# Patient Record
Sex: MALE | Race: WHITE | NOT HISPANIC OR LATINO | Employment: FULL TIME | URBAN - METROPOLITAN AREA
[De-identification: names, ages, dates, MRNs, and addresses within clinical notes are randomized per-mention and may not be internally consistent; named-entity substitution may affect disease eponyms.]

---

## 2021-11-08 ENCOUNTER — APPOINTMENT (OUTPATIENT)
Dept: CT IMAGING | Facility: CLINIC | Age: 41
End: 2021-11-08
Attending: EMERGENCY MEDICINE
Payer: COMMERCIAL

## 2021-11-08 ENCOUNTER — HOSPITAL ENCOUNTER (INPATIENT)
Facility: CLINIC | Age: 41
LOS: 5 days | Discharge: HOME | End: 2021-11-13
Attending: EMERGENCY MEDICINE | Admitting: INTERNAL MEDICINE
Payer: COMMERCIAL

## 2021-11-08 DIAGNOSIS — U07.1 INFECTION DUE TO 2019 NOVEL CORONAVIRUS: ICD-10-CM

## 2021-11-08 LAB
ALBUMIN SERPL-MCNC: 3.4 G/DL (ref 3.4–5)
ALP SERPL-CCNC: 44 U/L (ref 40–150)
ALT SERPL W P-5'-P-CCNC: 71 U/L (ref 0–70)
ANION GAP SERPL CALCULATED.3IONS-SCNC: 7 MMOL/L (ref 3–14)
AST SERPL W P-5'-P-CCNC: 65 U/L (ref 0–45)
BASOPHILS # BLD AUTO: 0 10E3/UL (ref 0–0.2)
BASOPHILS NFR BLD AUTO: 0 %
BILIRUB SERPL-MCNC: 0.4 MG/DL (ref 0.2–1.3)
BUN SERPL-MCNC: 10 MG/DL (ref 7–30)
CALCIUM SERPL-MCNC: 8.4 MG/DL (ref 8.5–10.1)
CHLORIDE BLD-SCNC: 95 MMOL/L (ref 94–109)
CO2 SERPL-SCNC: 30 MMOL/L (ref 20–32)
CREAT SERPL-MCNC: 1.04 MG/DL (ref 0.66–1.25)
D DIMER PPP FEU-MCNC: 0.61 UG/ML FEU (ref 0–0.5)
EOSINOPHIL # BLD AUTO: 0 10E3/UL (ref 0–0.7)
EOSINOPHIL NFR BLD AUTO: 0 %
ERYTHROCYTE [DISTWIDTH] IN BLOOD BY AUTOMATED COUNT: 12.4 % (ref 10–15)
GFR SERPL CREATININE-BSD FRML MDRD: 88 ML/MIN/1.73M2
GLUCOSE BLD-MCNC: 143 MG/DL (ref 70–99)
HCT VFR BLD AUTO: 43.9 % (ref 40–53)
HGB BLD-MCNC: 15.1 G/DL (ref 13.3–17.7)
HOLD SPECIMEN: NORMAL
IMM GRANULOCYTES # BLD: 0.1 10E3/UL
IMM GRANULOCYTES NFR BLD: 1 %
LYMPHOCYTES # BLD AUTO: 1.2 10E3/UL (ref 0.8–5.3)
LYMPHOCYTES NFR BLD AUTO: 13 %
MCH RBC QN AUTO: 31 PG (ref 26.5–33)
MCHC RBC AUTO-ENTMCNC: 34.4 G/DL (ref 31.5–36.5)
MCV RBC AUTO: 90 FL (ref 78–100)
MONOCYTES # BLD AUTO: 0.3 10E3/UL (ref 0–1.3)
MONOCYTES NFR BLD AUTO: 4 %
NEUTROPHILS # BLD AUTO: 7.2 10E3/UL (ref 1.6–8.3)
NEUTROPHILS NFR BLD AUTO: 82 %
NRBC # BLD AUTO: 0 10E3/UL
NRBC BLD AUTO-RTO: 0 /100
PLATELET # BLD AUTO: 199 10E3/UL (ref 150–450)
POTASSIUM BLD-SCNC: 4.3 MMOL/L (ref 3.4–5.3)
PROT SERPL-MCNC: 8.5 G/DL (ref 6.8–8.8)
RBC # BLD AUTO: 4.87 10E6/UL (ref 4.4–5.9)
SODIUM SERPL-SCNC: 132 MMOL/L (ref 133–144)
TROPONIN I SERPL-MCNC: <0.015 UG/L (ref 0–0.04)
WBC # BLD AUTO: 8.7 10E3/UL (ref 4–11)

## 2021-11-08 PROCEDURE — 82040 ASSAY OF SERUM ALBUMIN: CPT | Performed by: EMERGENCY MEDICINE

## 2021-11-08 PROCEDURE — 85379 FIBRIN DEGRADATION QUANT: CPT | Performed by: EMERGENCY MEDICINE

## 2021-11-08 PROCEDURE — 120N000001 HC R&B MED SURG/OB

## 2021-11-08 PROCEDURE — 258N000003 HC RX IP 258 OP 636: Performed by: INTERNAL MEDICINE

## 2021-11-08 PROCEDURE — 250N000011 HC RX IP 250 OP 636: Performed by: EMERGENCY MEDICINE

## 2021-11-08 PROCEDURE — 96375 TX/PRO/DX INJ NEW DRUG ADDON: CPT

## 2021-11-08 PROCEDURE — 250N000013 HC RX MED GY IP 250 OP 250 PS 637: Performed by: INTERNAL MEDICINE

## 2021-11-08 PROCEDURE — 250N000011 HC RX IP 250 OP 636: Performed by: INTERNAL MEDICINE

## 2021-11-08 PROCEDURE — 36415 COLL VENOUS BLD VENIPUNCTURE: CPT | Performed by: EMERGENCY MEDICINE

## 2021-11-08 PROCEDURE — 250N000009 HC RX 250: Performed by: INTERNAL MEDICINE

## 2021-11-08 PROCEDURE — 250N000009 HC RX 250: Performed by: EMERGENCY MEDICINE

## 2021-11-08 PROCEDURE — 250N000013 HC RX MED GY IP 250 OP 250 PS 637: Performed by: EMERGENCY MEDICINE

## 2021-11-08 PROCEDURE — 96361 HYDRATE IV INFUSION ADD-ON: CPT

## 2021-11-08 PROCEDURE — 84484 ASSAY OF TROPONIN QUANT: CPT | Performed by: EMERGENCY MEDICINE

## 2021-11-08 PROCEDURE — 258N000003 HC RX IP 258 OP 636: Performed by: EMERGENCY MEDICINE

## 2021-11-08 PROCEDURE — 96365 THER/PROPH/DIAG IV INF INIT: CPT | Mod: 59

## 2021-11-08 PROCEDURE — 85025 COMPLETE CBC W/AUTO DIFF WBC: CPT | Performed by: EMERGENCY MEDICINE

## 2021-11-08 PROCEDURE — 71275 CT ANGIOGRAPHY CHEST: CPT

## 2021-11-08 PROCEDURE — 93005 ELECTROCARDIOGRAM TRACING: CPT

## 2021-11-08 PROCEDURE — 99223 1ST HOSP IP/OBS HIGH 75: CPT | Mod: AI | Performed by: INTERNAL MEDICINE

## 2021-11-08 PROCEDURE — 94640 AIRWAY INHALATION TREATMENT: CPT

## 2021-11-08 PROCEDURE — 99285 EMERGENCY DEPT VISIT HI MDM: CPT | Mod: 25

## 2021-11-08 PROCEDURE — 80053 COMPREHEN METABOLIC PANEL: CPT | Performed by: EMERGENCY MEDICINE

## 2021-11-08 RX ORDER — ACETAMINOPHEN 325 MG/1
975 TABLET ORAL EVERY 8 HOURS
Status: DISCONTINUED | OUTPATIENT
Start: 2021-11-08 | End: 2021-11-13 | Stop reason: HOSPADM

## 2021-11-08 RX ORDER — ONDANSETRON 4 MG/1
4 TABLET, ORALLY DISINTEGRATING ORAL EVERY 6 HOURS PRN
Status: DISCONTINUED | OUTPATIENT
Start: 2021-11-08 | End: 2021-11-13 | Stop reason: HOSPADM

## 2021-11-08 RX ORDER — ACETAMINOPHEN 325 MG/1
TABLET ORAL
Status: DISCONTINUED
Start: 2021-11-08 | End: 2021-11-09 | Stop reason: HOSPADM

## 2021-11-08 RX ORDER — ACETAMINOPHEN 500 MG
1000 TABLET ORAL ONCE
Status: COMPLETED | OUTPATIENT
Start: 2021-11-08 | End: 2021-11-08

## 2021-11-08 RX ORDER — IOPAMIDOL 755 MG/ML
500 INJECTION, SOLUTION INTRAVASCULAR ONCE
Status: COMPLETED | OUTPATIENT
Start: 2021-11-08 | End: 2021-11-08

## 2021-11-08 RX ORDER — CODEINE PHOSPHATE AND GUAIFENESIN 10; 100 MG/5ML; MG/5ML
5-10 SOLUTION ORAL EVERY 4 HOURS PRN
COMMUNITY
Start: 2021-11-05 | End: 2021-11-19

## 2021-11-08 RX ORDER — ONDANSETRON 2 MG/ML
4 INJECTION INTRAMUSCULAR; INTRAVENOUS EVERY 6 HOURS PRN
Status: DISCONTINUED | OUTPATIENT
Start: 2021-11-08 | End: 2021-11-13 | Stop reason: HOSPADM

## 2021-11-08 RX ORDER — BENZONATATE 100 MG/1
100-200 CAPSULE ORAL 3 TIMES DAILY PRN
COMMUNITY
Start: 2021-11-06

## 2021-11-08 RX ORDER — DEXAMETHASONE SODIUM PHOSPHATE 10 MG/ML
6 INJECTION, SOLUTION INTRAMUSCULAR; INTRAVENOUS ONCE
Status: COMPLETED | OUTPATIENT
Start: 2021-11-08 | End: 2021-11-08

## 2021-11-08 RX ORDER — LIDOCAINE 40 MG/G
CREAM TOPICAL
Status: DISCONTINUED | OUTPATIENT
Start: 2021-11-08 | End: 2021-11-13 | Stop reason: HOSPADM

## 2021-11-08 RX ORDER — ALBUTEROL SULFATE 90 UG/1
2 AEROSOL, METERED RESPIRATORY (INHALATION) 4 TIMES DAILY
Status: DISCONTINUED | OUTPATIENT
Start: 2021-11-08 | End: 2021-11-09

## 2021-11-08 RX ORDER — FAMOTIDINE 20 MG/1
20 TABLET, FILM COATED ORAL 2 TIMES DAILY
Status: DISCONTINUED | OUTPATIENT
Start: 2021-11-08 | End: 2021-11-13 | Stop reason: HOSPADM

## 2021-11-08 RX ADMIN — ACETAMINOPHEN 975 MG: 325 TABLET, FILM COATED ORAL at 21:11

## 2021-11-08 RX ADMIN — ALBUTEROL SULFATE 2 PUFF: 90 AEROSOL, METERED RESPIRATORY (INHALATION) at 21:13

## 2021-11-08 RX ADMIN — REMDESIVIR 200 MG: 100 INJECTION, POWDER, LYOPHILIZED, FOR SOLUTION INTRAVENOUS at 21:13

## 2021-11-08 RX ADMIN — FAMOTIDINE 20 MG: 20 TABLET ORAL at 21:13

## 2021-11-08 RX ADMIN — ACETAMINOPHEN 1000 MG: 500 TABLET, FILM COATED ORAL at 12:24

## 2021-11-08 RX ADMIN — SODIUM CHLORIDE 90 ML: 9 INJECTION, SOLUTION INTRAVENOUS at 13:32

## 2021-11-08 RX ADMIN — SODIUM CHLORIDE 1000 ML: 9 INJECTION, SOLUTION INTRAVENOUS at 12:24

## 2021-11-08 RX ADMIN — ENOXAPARIN SODIUM 40 MG: 40 INJECTION SUBCUTANEOUS at 21:13

## 2021-11-08 RX ADMIN — DEXAMETHASONE SODIUM PHOSPHATE 6 MG: 10 INJECTION INTRAMUSCULAR; INTRAVENOUS at 14:30

## 2021-11-08 RX ADMIN — IOPAMIDOL 77 ML: 755 INJECTION, SOLUTION INTRAVENOUS at 13:32

## 2021-11-08 RX ADMIN — SODIUM CHLORIDE 50 ML: 900 INJECTION INTRAVENOUS at 21:43

## 2021-11-08 ASSESSMENT — ENCOUNTER SYMPTOMS
FEVER: 1
CHILLS: 1
VOMITING: 0
SHORTNESS OF BREATH: 1
COUGH: 1

## 2021-11-08 ASSESSMENT — ACTIVITIES OF DAILY LIVING (ADL)
ADLS_ACUITY_SCORE: 7

## 2021-11-08 NOTE — ED TRIAGE NOTES
Patient arrives via EMS, patient was diagnosed with COVID on Friday and also states he was told he has pneumonia.  Patient states he hasn't gotten any better since Friday.  Patient arrives on 6 liters via nasal cannula, EMS states he was 88% upon there arrival.  Patient also has a 102.2 fever.

## 2021-11-08 NOTE — H&P
[facility]    Hospitalist History and Physical    Name: Jay Becker    MRN: [id number]  YOB: 1980    Age: 41 year old  Date of Admission:  11/8/2021  Date of Service (when I saw the patient): 11/08/21    Assessment & Plan   Jay Becker is a 41 year old male unvaccinated with with history of obesity, presented to the emergency room with ongoing cough fever shortness of breath with positive Covid test on 11/5/2021 was found to be hypoxic in the emergency room admitted for acute respiratory failure with hypoxia      Acute respiratory failure with hypoxia secondary to COVID-19 pneumonia  -Unvaccinated patient  -Symptoms started 7 days ago  -COVID-19 + 11/5/2021  -Presented with worsening dyspnea  -Requiring 2 L of supplemental O2 to keep oxygenation above 90%  -We will start on Decadron  -Discussed remdesivir will order  -Enoxaparin prophylaxis  -GI prophylaxis  -Supplemental O2 wean as able  -Admit to medicine    DVT Prophylaxis: Enoxaparin (Lovenox) SQ  Code Status: Full Code    Disposition: Admitted as inpatient for COVID-19 pneumonia at least 2 to 3 days    Primary Care Physician   Physician No Ref-Primary    Chief Complaint   Ongoing symptoms worsening lethargy weakness shortness of breath cough and fever    History is obtained from the patient    History of Present Illness   Jay Becker is a 41 year old male with past medical his significant for obesity presented to the emergency room with worsening cough fever generalized malaise and weakness.  Patient is unvaccinated symptoms initially started about 7 days ago when he was in [place].  He initially felt sick while he was there and had a sick nephew with strep throat.  Symptoms started with cough shortness of breath fevers generalized malaise and weakness.  With ongoing symptoms he got tested for COVID-19 on 11 5 which was positive.  Today he had ongoing symptoms with significant night sweats malaise weakness fatigue unable to  do anything with lack of energy and trouble breathing came to the emergency room.  In the emergency room was found to be hypoxic is being admitted for acute respiratory failure.  More than 10 point review of systems was carried out was otherwise negative    Past Medical History    No past medical history on file.      Past Surgical History   No past surgical history on file.    Prior to Admission Medications   None     Allergies   No Known Allergies    Social History   Social History     Tobacco Use     Smoking status: Not on file     Smokeless tobacco: Not on file   Substance Use Topics     Alcohol use: Not on file     Social History     Social History Narrative     Not on file     Lives Alone,  non-smoker, denies use of alcohol    Family History   I have reviewed this patient's family history and updated it with pertinent information if needed.   No family history on file.  History significant for diabetes mellitus has diabetes mellitus    Review of Systems   A Comprehensive greater than 10 system review of systems was carried out.  Pertinent positives and negatives are noted above.  Otherwise negative for contributory information.    Physical Exam   Temp: (!) 102.2  F (39  C) Temp src: Oral BP: (!) 148/102 Pulse: 96   Resp: 22 SpO2: (!) 89 % O2 Device: None (Room air) Oxygen Delivery: 6 LPM  Vital Signs with Ranges  Temp:  [102.2  F (39  C)] 102.2  F (39  C)  Pulse:  [] 96  Resp:  [22] 22  BP: (145-158)/() 148/102  SpO2:  [89 %-97 %] 89 %  280 lbs 0 oz    GEN:  Alert, oriented x 3, appears comfortable, no overt distress.  Requiring supplemental O2  HEENT:  Normocephalic/atraumatic, no scleral icterus, no nasal discharge, mouth moist.  CV:  Regular rate and rhythm, no murmur or JVD.  S1 + S2 noted, no S3 or S4.  LUNGS: Bibasilar crackles symmetric chest rise on inhalation noted.  ABD:  Active bowel sounds, soft, non-tender/non-distended.  No rebound/guarding/rigidity.  EXT:  No edema.  No cyanosis.  No  joint synovitis noted.  SKIN:  Dry to touch, no exanthems noted in the visualized areas.  NEURO:  Symmetric muscle strength,.  No new focal deficits appreciated.    Data   Data reviewed today:  I personally reviewed the EKG tracing showing Normal sinus rhythm.    No results for input(s): PH, PHV, PO2, PO2V, SAT, PCO2, PCO2V, HCO3, HCO3V in the last 168 hours.  Recent Labs   Lab 11/08/21  1203   WBC 8.7   HGB 15.1   HCT 43.9   MCV 90        Recent Labs   Lab 11/08/21  1203   *   POTASSIUM 4.3   CHLORIDE 95   CO2 30   ANIONGAP 7   *   BUN 10   CR 1.04   GFRESTIMATED 88   ARNALDO 8.4*     No results for input(s): CULT in the last 168 hours.  No results for input(s): NTBNPI, NTBNP in the last 168 hours.  Recent Labs   Lab 11/08/21  1203   DD 0.61*     Recent Labs   Lab 11/08/21  1203   HGB 15.1     Recent Labs   Lab 11/08/21  1203   AST 65*   ALT 71*   ALKPHOS 44   BILITOTAL 0.4     No results for input(s): INR in the last 168 hours.  No results for input(s): LACT in the last 168 hours.  No results for input(s): LIPASE in the last 168 hours.  No results for input(s): TSH in the last 168 hours.  Recent Labs   Lab 11/08/21  1203   TROPONIN <0.015     No results for input(s): COLOR, APPEARANCE, URINEGLC, URINEBILI, URINEKETONE, SG, UBLD, URINEPH, PROTEIN, UROBILINOGEN, NITRITE, LEUKEST, RBCU, WBCU in the last 168 hours.    Recent Results (from the past 24 hour(s))   CT Chest Pulmonary Embolism w Contrast    Narrative    CT CHEST PULMONARY EMBOLISM WITH CONTRAST 11/8/2021 1:39 PM    CLINICAL HISTORY: PE suspected, low/intermediate probability, positive  D-dimer. Shortness of breath. Fever.    TECHNIQUE: CT angiogram chest during arterial phase injection IV  contrast. 2D and 3D MIP reconstructions were performed by the CT  technologist. Dose reduction techniques were used.     CONTRAST: 77mL Isovue-370    COMPARISON: None.    FINDINGS:  ANGIOGRAM CHEST: Pulmonary arteries are normal caliber and negative  for  pulmonary emboli. Thoracic aorta is negative for dissection.    LUNGS AND PLEURA: Multifocal bilateral irregular patchy consolidation  involving all lobes of both lungs. This has a bibasilar predominance.  No effusion.    MEDIASTINUM/AXILLAE: Normal.    CORONARY ARTERY CALCIFICATION: None.    UPPER ABDOMEN: Hepatic steatosis.    MUSCULOSKELETAL: Normal.      Impression    IMPRESSION:  1.  Bilateral irregular pulmonary consolidation consistent with  atypical pneumonia such as COVID 19 pneumonia.  2.  No evidence for pulmonary embolism.  3.  Fatty liver.    CHRISTOPH ESTRADA MD         SYSTEM ID:  [id number]

## 2021-11-08 NOTE — ED PROVIDER NOTES
History   Chief Complaint:  Covid Concern       HPI     Jay Becker is a 41 year old male who presents with a COVID concern via EMS.  Patient reports he became ill approximately 7 days prior.  Initial symptoms were nasal congestion, rhinorrhea and mild cough.  He tested positive for COVID-19 as an outpatient.  Patient is unvaccinated.  Symptoms progressed to generalized weakness, fatigue and fever.  He presents today for increasing shortness of breath.  Dyspnea is worsened by exertion, alleviated by rest.  There is no associated chest pain, hemoptysis.  No history of DVT, PE, unilateral leg swelling, malignancy.  He recently drove from [place] within the last 4 weeks.  He denies vomiting, rash or any other concerns.    Review of Systems   Constitutional: Positive for chills and fever.   Respiratory: Positive for cough and shortness of breath.    Cardiovascular: Negative for chest pain.   Gastrointestinal: Negative for vomiting.   Skin: Negative for rash.   All other systems reviewed and are negative.    Allergies:  No Known Drug Allergies      Medications:  There are no current outpatient medications.    Past Medical History:    No past medical history.     Social History:  The patient was accompanied to the ED by EMS.      Physical Exam     Patient Vitals for the past 24 hrs:   BP Temp Temp src Pulse Resp SpO2 Weight   11/08/21 1526 -- -- -- -- -- 94 % --   11/08/21 1524 -- -- -- -- -- 94 % --   11/08/21 1523 -- -- -- -- -- 94 % --   11/08/21 1522 -- -- -- -- -- 91 % --   11/08/21 1515 (!) 144/94 -- -- 85 -- 93 % --   11/08/21 1500 (!) 144/87 -- -- 87 -- 90 % --   11/08/21 1445 (!) 140/90 -- -- 81 -- 92 % --   11/08/21 1430 137/81 -- -- 87 -- 92 % --   11/08/21 1402 -- -- -- -- -- (!) 89 % --   11/08/21 1401 -- -- -- -- -- 90 % --   11/08/21 1400 -- -- -- -- -- 97 % --   11/08/21 1330 -- -- -- -- -- 91 % --   11/08/21 1315 (!) 148/102 -- -- 96 -- 92 % --   11/08/21 1300 (!) 145/94 -- -- 98 -- 91 % --   11/08/21 1156  (!) 158/107 (!) 102.2  F (39  C) Oral 101 22 97 % 127 kg (280 lb)       Physical Exam      Eyes:    Conjunctiva normal  Neck:    Supple, no meningismus.     CV:     Regular rate and rhythm.      No murmurs, rubs or gallops.       No unilateral leg swelling.       2+ radial pulses bilateral.       No lower extremity edema.  PULM:    Clear to auscultation bilateral.       No respiratory distress.      Good air exchange.     No rales or wheezing.     Intermittent non-productive cough  ABD:    Soft, non-tender, non-distended.       No pulsatile masses.       No rebound, guarding or rigidity.  MSK:     No gross deformity to all four extremities.   LYMPH:   No cervical lymphadenopathy.  NEURO:   Alert. Good muscle tone, no atrophy.  Skin:    Warm, dry and intact.    Psych:    Mood is depressed and affect is appropriate.        Emergency Department Course   ECG  ECG obtained at 1249, ECG read at 1255  Normal sinus rhythm   Cannot rule out Inferior infarct, age undetermined   Abnormal ECG  Rate 99 bpm. IN interval 152 ms. QRS duration 84 ms. QT/QTc 326/418 ms. P-R-T axes 48 9 26.     Imaging:  CT Chest Pulmonary Embolism w Contrast  1.  Bilateral irregular pulmonary consolidation consistent with  atypical pneumonia such as COVID 19 pneumonia.  2.  No evidence for pulmonary embolism.  3.  Fatty liver.    CHRISTOPH ESTRADA MD   Report per radiology    Laboratory:  CBC: WBC 8.7, HGB 15.1,   CMP: sodium 132 (L), calcium 8.4 (L), glucose 143 (H), AST 65 (H), ALT 71 (H) o/w WNL (Creatinine 1.04)   Troponin (Collected 1203): <0.015  D Dimer (Collected 1203): 0.61 (H)    Emergency Department Course:  Reviewed:  I reviewed nursing notes, vitals, past medical history and Care Everywhere    Assessments:  1210 I obtained history and examined the patient as noted above.     1430 I rechecked the patient and explained findings.     Consults:  1530 I spoke with Dr. Santana of the hospitalist service from  regarding patient's  presentation, findings, and plan of care.     Interventions:  1224 0.9% NS 1000 mL IV  1224 Tylenol 1000 mg PO  1430 dexamethasone 6 mg IV    Disposition:  The patient was admitted to the hospital under the care of Dr. Santana.     Impression & Plan       Medical Decision Making:    Jay Becker is a 41 year old unvaccinated male with known COVID presents with increased shortness of breath.  Patient is oxygen dependent at 1 to 2 L per nasal cannula.  Due to oxygen requirements, patient given dexamethasone and will require admission.  No evidence of associated viral myocarditis.  D-dimer elevated thus underwent CT scan ruling out pulmonary embolism but revealed inflammatory changes consistent with COVID-19.  Patient will be transferred to a medical bed when available.  No other complicating factors noted.    Diagnosis:    ICD-10-CM    1. Infection due to 2019 novel coronavirus  U07.1        Scribe Disclosure:  Rubi DELEON, am serving as a scribe at 12:04 PM on 11/8/2021 to document services personally performed by Crispin Gonzalez MD based on my observations and the provider's statements to me.                   Crispin Gonzalez MD  11/08/21 3625

## 2021-11-09 LAB
ANION GAP SERPL CALCULATED.3IONS-SCNC: 4 MMOL/L (ref 3–14)
ATRIAL RATE - MUSE: 99 BPM
BUN SERPL-MCNC: 10 MG/DL (ref 7–30)
CALCIUM SERPL-MCNC: 7.3 MG/DL (ref 8.5–10.1)
CHLORIDE BLD-SCNC: 107 MMOL/L (ref 94–109)
CO2 SERPL-SCNC: 28 MMOL/L (ref 20–32)
CREAT SERPL-MCNC: 0.7 MG/DL (ref 0.66–1.25)
DIASTOLIC BLOOD PRESSURE - MUSE: NORMAL MMHG
ERYTHROCYTE [DISTWIDTH] IN BLOOD BY AUTOMATED COUNT: 12.2 % (ref 10–15)
ERYTHROCYTE [DISTWIDTH] IN BLOOD BY AUTOMATED COUNT: 12.5 % (ref 10–15)
GFR SERPL CREATININE-BSD FRML MDRD: >90 ML/MIN/1.73M2
GLUCOSE BLD-MCNC: 164 MG/DL (ref 70–99)
HCT VFR BLD AUTO: 27.9 % (ref 40–53)
HCT VFR BLD AUTO: 43.3 % (ref 40–53)
HGB BLD-MCNC: 14.6 G/DL (ref 13.3–17.7)
HGB BLD-MCNC: 9.3 G/DL (ref 13.3–17.7)
INTERPRETATION ECG - MUSE: NORMAL
MCH RBC QN AUTO: 30.6 PG (ref 26.5–33)
MCH RBC QN AUTO: 31 PG (ref 26.5–33)
MCHC RBC AUTO-ENTMCNC: 33.3 G/DL (ref 31.5–36.5)
MCHC RBC AUTO-ENTMCNC: 33.7 G/DL (ref 31.5–36.5)
MCV RBC AUTO: 91 FL (ref 78–100)
MCV RBC AUTO: 93 FL (ref 78–100)
P AXIS - MUSE: 48 DEGREES
PLAT MORPH BLD: NORMAL
PLATELET # BLD AUTO: 129 10E3/UL (ref 150–450)
PLATELET # BLD AUTO: 208 10E3/UL (ref 150–450)
POTASSIUM BLD-SCNC: 4.1 MMOL/L (ref 3.4–5.3)
PR INTERVAL - MUSE: 152 MS
QRS DURATION - MUSE: 84 MS
QT - MUSE: 326 MS
QTC - MUSE: 418 MS
R AXIS - MUSE: 9 DEGREES
RBC # BLD AUTO: 3 10E6/UL (ref 4.4–5.9)
RBC # BLD AUTO: 4.77 10E6/UL (ref 4.4–5.9)
RBC MORPH BLD: NORMAL
SODIUM SERPL-SCNC: 139 MMOL/L (ref 133–144)
SYSTOLIC BLOOD PRESSURE - MUSE: NORMAL MMHG
T AXIS - MUSE: 26 DEGREES
VENTRICULAR RATE- MUSE: 99 BPM
WBC # BLD AUTO: 10.9 10E3/UL (ref 4–11)
WBC # BLD AUTO: 7.1 10E3/UL (ref 4–11)

## 2021-11-09 PROCEDURE — XW033E5 INTRODUCTION OF REMDESIVIR ANTI-INFECTIVE INTO PERIPHERAL VEIN, PERCUTANEOUS APPROACH, NEW TECHNOLOGY GROUP 5: ICD-10-PCS | Performed by: INTERNAL MEDICINE

## 2021-11-09 PROCEDURE — 250N000009 HC RX 250: Performed by: INTERNAL MEDICINE

## 2021-11-09 PROCEDURE — 258N000003 HC RX IP 258 OP 636: Performed by: INTERNAL MEDICINE

## 2021-11-09 PROCEDURE — 99232 SBSQ HOSP IP/OBS MODERATE 35: CPT | Performed by: INTERNAL MEDICINE

## 2021-11-09 PROCEDURE — 36415 COLL VENOUS BLD VENIPUNCTURE: CPT | Performed by: INTERNAL MEDICINE

## 2021-11-09 PROCEDURE — 85027 COMPLETE CBC AUTOMATED: CPT | Performed by: INTERNAL MEDICINE

## 2021-11-09 PROCEDURE — 96365 THER/PROPH/DIAG IV INF INIT: CPT | Mod: 59

## 2021-11-09 PROCEDURE — 82310 ASSAY OF CALCIUM: CPT | Performed by: INTERNAL MEDICINE

## 2021-11-09 PROCEDURE — 250N000013 HC RX MED GY IP 250 OP 250 PS 637: Performed by: INTERNAL MEDICINE

## 2021-11-09 PROCEDURE — 96366 THER/PROPH/DIAG IV INF ADDON: CPT

## 2021-11-09 PROCEDURE — 250N000012 HC RX MED GY IP 250 OP 636 PS 637: Performed by: INTERNAL MEDICINE

## 2021-11-09 PROCEDURE — 120N000001 HC R&B MED SURG/OB

## 2021-11-09 PROCEDURE — 250N000011 HC RX IP 250 OP 636: Performed by: INTERNAL MEDICINE

## 2021-11-09 RX ORDER — ALBUTEROL SULFATE 90 UG/1
2 AEROSOL, METERED RESPIRATORY (INHALATION) EVERY 6 HOURS PRN
Status: DISCONTINUED | OUTPATIENT
Start: 2021-11-09 | End: 2021-11-13 | Stop reason: HOSPADM

## 2021-11-09 RX ADMIN — SODIUM CHLORIDE 50 ML: 900 INJECTION INTRAVENOUS at 18:05

## 2021-11-09 RX ADMIN — ENOXAPARIN SODIUM 40 MG: 40 INJECTION SUBCUTANEOUS at 08:07

## 2021-11-09 RX ADMIN — DEXAMETHASONE 6 MG: 2 TABLET ORAL at 13:12

## 2021-11-09 RX ADMIN — ALBUTEROL SULFATE 2 PUFF: 90 AEROSOL, METERED RESPIRATORY (INHALATION) at 11:27

## 2021-11-09 RX ADMIN — REMDESIVIR 100 MG: 100 INJECTION, POWDER, LYOPHILIZED, FOR SOLUTION INTRAVENOUS at 16:23

## 2021-11-09 RX ADMIN — FAMOTIDINE 20 MG: 20 TABLET ORAL at 20:33

## 2021-11-09 RX ADMIN — ALBUTEROL SULFATE 2 PUFF: 90 AEROSOL, METERED RESPIRATORY (INHALATION) at 08:09

## 2021-11-09 RX ADMIN — ACETAMINOPHEN 975 MG: 325 TABLET, FILM COATED ORAL at 05:28

## 2021-11-09 RX ADMIN — ENOXAPARIN SODIUM 40 MG: 40 INJECTION SUBCUTANEOUS at 20:33

## 2021-11-09 RX ADMIN — FAMOTIDINE 20 MG: 20 TABLET ORAL at 08:07

## 2021-11-09 RX ADMIN — ACETAMINOPHEN 975 MG: 325 TABLET, FILM COATED ORAL at 22:49

## 2021-11-09 RX ADMIN — ACETAMINOPHEN 975 MG: 325 TABLET, FILM COATED ORAL at 13:11

## 2021-11-09 RX ADMIN — ALBUTEROL SULFATE 2 PUFF: 90 AEROSOL, METERED RESPIRATORY (INHALATION) at 15:44

## 2021-11-09 ASSESSMENT — ACTIVITIES OF DAILY LIVING (ADL)
ADLS_ACUITY_SCORE: 7
ADLS_ACUITY_SCORE: 9
ADLS_ACUITY_SCORE: 7
ADLS_ACUITY_SCORE: 7
ADLS_ACUITY_SCORE: 9
ADLS_ACUITY_SCORE: 7
ADLS_ACUITY_SCORE: 9
ADLS_ACUITY_SCORE: 7
ADLS_ACUITY_SCORE: 9
ADLS_ACUITY_SCORE: 7

## 2021-11-09 ASSESSMENT — MIFFLIN-ST. JEOR: SCORE: 1755.84

## 2021-11-09 NOTE — ED NOTES
"2245 - FYI page'd hospitalist per notify provider order: SpO2 90-92% 6 lt n/c with 26-30 resps.    10:59 PM - provider response per [facility] \"increase O2\"     Pt 93% 9 lt n/c  "

## 2021-11-09 NOTE — PROGRESS NOTES
Hemoglobin 9.3 down from 15.1 yesterday afternoon and platelet count is 129 down from 199.  He received 1 L of normal saline.  He is here for COVID-19.  No sign of bleeding per nursing.  Blood was drawn off of his PIV.  Patient is normotensive with heart rate in the 80s.  Suspect erroneous result.  -Ordered CBC stat, recommend peripheral lab draw

## 2021-11-09 NOTE — PHARMACY-ADMISSION MEDICATION HISTORY
Admission medication history interview status for this patient is complete. See [facility] admission navigator for allergy information, prior to admission medications and immunization status.     Medication history interview done, indicate source(s): Patient  Medication history resources (including written lists, pill bottles, clinic record): [facility] dispense records  Pharmacy: N/A    Changes made to PTA medication list:  Added: ALL    Prior to Admission medications    Medication Sig Last Dose Taking? Auth Provider   benzonatate (TESSALON) 100 MG capsule Take 100-200 mg by mouth 3 times daily as needed for cough  11/8/2021 at Unknown time Yes Unknown, Entered By History   guaiFENesin-codeine (ROBITUSSIN AC) 100-10 MG/5ML solution Take 5-10 mLs by mouth every 4 hours as needed for cough 11/8/2021 at Unknown time Yes Unknown, Entered By History

## 2021-11-09 NOTE — ED NOTES
Hospitalist paged due to drop in hgb from previous collect. Pt hgb 15.1 then dropped to 9, pt received 1L IVF. No active bleeding, VSS. MD to order redraw, changed to lab collect per MD recommendation.

## 2021-11-09 NOTE — PROGRESS NOTES
[facility]    Hospitalist Progress Note  Provider : Torsten Grier MD  Date of Service (when I saw the patient): 11/09/2021    Assessment & Plan   Jay Becker is a 41 year old male unvaccinated with with history of obesity, presented to the emergency room with ongoing cough fever shortness of breath with positive Covid test on 11/5/2021 was found to be hypoxic in the emergency room admitted for acute respiratory failure with hypoxia     Acute respiratory failure with hypoxia secondary to COVID-19 pneumonia  -Unvaccinated patient  -Symptoms started 7 days prior to admission  -COVID-19 + 11/5/2021  -Presented with worsening dyspnea  -Requiring 8 L of supplemental O2 to keep oxygenation above 90%  -Will continue on Decadron and remdisivir  -Enoxaparin prophylaxis  -GI prophylaxis  -Supplemental O2 wean as able     DVT Prophylaxis: Enoxaparin (Lovenox) SQ  Code Status: Full Code    Disposition: Expected discharge: anticipate at least 2-4 nights of hospital course until breathing improved    Torsten Grier MD    Interval History   Patient seen and examined. He stated that he is feeling better. No fever. No nausea or vomiting. Feels comfortable on 8 liters oxygen.     -Data reviewed today: I reviewed all new labs and imaging results over the last 24 hours. I personally reviewed    Physical Exam   Temp: 98.1  F (36.7  C) Temp src: Oral BP: (!) 155/102 Pulse: 85   Resp: 28 SpO2: 90 % O2 Device: Oxymask Oxygen Delivery: 8 LPM  Vitals:    11/08/21 1156   Weight: 127 kg (280 lb)     Vital Signs with Ranges  Temp:  [98.1  F (36.7  C)-98.7  F (37.1  C)] 98.1  F (36.7  C)  Pulse:  [] 85  Resp:  [28] 28  BP: (114-155)/() 155/102  SpO2:  [88 %-97 %] 90 %  No intake/output data recorded.    GEN:  Alert, oriented x 3, appears comfortable, NAD.  HEENT:  Normocephalic/atraumatic, no scleral icterus, no nasal discharge, mouth moist.  CV:  Regular rate and rhythm, no murmur or JVD.  S1 + S2  noted, no S3 or S4.  LUNGS:  Clear to auscultation bilaterally without rales/rhonchi/wheezing/retractions.  Symmetric chest rise on inhalation noted.  ABD:  Active bowel sounds, soft, non-tender/non-distended.  No rebound/guarding/rigidity.  EXT:  No edema or cyanosis.  Hands/feet warm to touch with good signs of peripheral perfusion.  No joint synovitis noted.  SKIN:  Dry to touch, no exanthems noted in the visualized areas.  NEURO:  Symmetric muscle strength, sensation to touch grossly intact.  No new focal deficits appreciated.    Medications       remdesivir  100 mg Intravenous Q24H    And     sodium chloride 0.9%  50 mL Intravenous Q24H     acetaminophen  975 mg Oral Q8H     albuterol  2 puff Inhalation 4x Daily     dexamethasone  6 mg Oral Daily     enoxaparin ANTICOAGULANT  40 mg Subcutaneous BID     famotidine  20 mg Oral BID     sodium chloride (PF)  3 mL Intracatheter Q8H       Data   Recent Labs   Lab 11/09/21  0656 11/09/21  0532 11/08/21  1203   WBC 10.9 7.1 8.7   HGB 14.6 9.3* 15.1   MCV 91 93 90    129* 199   NA  --  139 132*   POTASSIUM  --  4.1 4.3   CHLORIDE  --  107 95   CO2  --  28 30   BUN  --  10 10   CR  --  0.70 1.04   ANIONGAP  --  4 7   ARNALDO  --  7.3* 8.4*   GLC  --  164* 143*   ALBUMIN  --   --  3.4   PROTTOTAL  --   --  8.5   BILITOTAL  --   --  0.4   ALKPHOS  --   --  44   ALT  --   --  71*   AST  --   --  65*   TROPONIN  --   --  <0.015       No results found for this or any previous visit (from the past 24 hour(s)).

## 2021-11-10 LAB
ANION GAP SERPL CALCULATED.3IONS-SCNC: 7 MMOL/L (ref 3–14)
BUN SERPL-MCNC: 16 MG/DL (ref 7–30)
CALCIUM SERPL-MCNC: 8.4 MG/DL (ref 8.5–10.1)
CHLORIDE BLD-SCNC: 102 MMOL/L (ref 94–109)
CO2 SERPL-SCNC: 29 MMOL/L (ref 20–32)
CREAT SERPL-MCNC: 0.78 MG/DL (ref 0.66–1.25)
ERYTHROCYTE [DISTWIDTH] IN BLOOD BY AUTOMATED COUNT: 12.2 % (ref 10–15)
GFR SERPL CREATININE-BSD FRML MDRD: >90 ML/MIN/1.73M2
GLUCOSE BLD-MCNC: 154 MG/DL (ref 70–99)
HCT VFR BLD AUTO: 42.2 % (ref 40–53)
HGB BLD-MCNC: 14.2 G/DL (ref 13.3–17.7)
MCH RBC QN AUTO: 30.7 PG (ref 26.5–33)
MCHC RBC AUTO-ENTMCNC: 33.6 G/DL (ref 31.5–36.5)
MCV RBC AUTO: 91 FL (ref 78–100)
PLATELET # BLD AUTO: 285 10E3/UL (ref 150–450)
POTASSIUM BLD-SCNC: 4.3 MMOL/L (ref 3.4–5.3)
RBC # BLD AUTO: 4.63 10E6/UL (ref 4.4–5.9)
SODIUM SERPL-SCNC: 138 MMOL/L (ref 133–144)
WBC # BLD AUTO: 14.3 10E3/UL (ref 4–11)

## 2021-11-10 PROCEDURE — 120N000001 HC R&B MED SURG/OB

## 2021-11-10 PROCEDURE — 85027 COMPLETE CBC AUTOMATED: CPT | Performed by: INTERNAL MEDICINE

## 2021-11-10 PROCEDURE — 258N000003 HC RX IP 258 OP 636: Performed by: INTERNAL MEDICINE

## 2021-11-10 PROCEDURE — 99232 SBSQ HOSP IP/OBS MODERATE 35: CPT | Performed by: INTERNAL MEDICINE

## 2021-11-10 PROCEDURE — 250N000009 HC RX 250: Performed by: INTERNAL MEDICINE

## 2021-11-10 PROCEDURE — 250N000013 HC RX MED GY IP 250 OP 250 PS 637: Performed by: INTERNAL MEDICINE

## 2021-11-10 PROCEDURE — 250N000011 HC RX IP 250 OP 636: Performed by: INTERNAL MEDICINE

## 2021-11-10 PROCEDURE — 82310 ASSAY OF CALCIUM: CPT | Performed by: INTERNAL MEDICINE

## 2021-11-10 PROCEDURE — 250N000012 HC RX MED GY IP 250 OP 636 PS 637: Performed by: INTERNAL MEDICINE

## 2021-11-10 PROCEDURE — 36415 COLL VENOUS BLD VENIPUNCTURE: CPT | Performed by: INTERNAL MEDICINE

## 2021-11-10 RX ADMIN — ENOXAPARIN SODIUM 40 MG: 40 INJECTION SUBCUTANEOUS at 22:11

## 2021-11-10 RX ADMIN — FAMOTIDINE 20 MG: 20 TABLET ORAL at 10:47

## 2021-11-10 RX ADMIN — ACETAMINOPHEN 975 MG: 325 TABLET, FILM COATED ORAL at 06:28

## 2021-11-10 RX ADMIN — GUAIFENESIN 10 ML: 100 SOLUTION ORAL at 13:50

## 2021-11-10 RX ADMIN — DEXAMETHASONE 6 MG: 2 TABLET ORAL at 13:50

## 2021-11-10 RX ADMIN — ACETAMINOPHEN 975 MG: 325 TABLET, FILM COATED ORAL at 13:50

## 2021-11-10 RX ADMIN — ACETAMINOPHEN 975 MG: 325 TABLET, FILM COATED ORAL at 22:11

## 2021-11-10 RX ADMIN — ENOXAPARIN SODIUM 40 MG: 40 INJECTION SUBCUTANEOUS at 10:47

## 2021-11-10 RX ADMIN — FAMOTIDINE 20 MG: 20 TABLET ORAL at 22:12

## 2021-11-10 RX ADMIN — REMDESIVIR 100 MG: 100 INJECTION, POWDER, LYOPHILIZED, FOR SOLUTION INTRAVENOUS at 16:35

## 2021-11-10 RX ADMIN — SODIUM CHLORIDE 50 ML: 900 INJECTION INTRAVENOUS at 16:35

## 2021-11-10 ASSESSMENT — ACTIVITIES OF DAILY LIVING (ADL)
ADLS_ACUITY_SCORE: 9

## 2021-11-10 NOTE — PLAN OF CARE
VSS. A&Ox4. Denies pain, SOB, or CP. Reports mild HOUSTON. Reports coughing and feeling as though he is choking, PRN robitussin given. No appetite, did not eat meals today. Remains on 7L O2 via oxymask. O2 sats 91-94%. Sleeping throughout most of shift. Ambulating SBA. Possible discharge to home 2-4 days.

## 2021-11-10 NOTE — CONSULTS
Care Management Follow Up    Length of Stay (days): 2    Expected Discharge Date: 11/12/2021       Referrals Placed by CM/SW: Financial Services  Private pay costs discussed: Not applicable    Additional Information:  CM acknowledges consult for financial/ insurance issues.  Patient's demographics show no insurance. Financial Counselor's office [id number]) is attempting to follow up with patient regarding assessment and insurance options.       Bhumi Wood RN      Bhumi Wood RN Case Manager  Inpatient Care Coordination  [facility]   [phone]

## 2021-11-10 NOTE — PLAN OF CARE
VSS on 8 L oxymask. Pt A/O x 4, able to make needs known. Up SBA. On decadron and remdesivir. PIV in rt arm SL, flushed w/o difficulty. Denies pain, reports mild his discomfort, on scheduled tylenol. On lovenox. Plans to discharge in 2-4 days. Will continue with plan of care.

## 2021-11-10 NOTE — PROGRESS NOTES
[facility]    Hospitalist Progress Note  Provider : Torsten Grier MD  Date of Service (when I saw the patient): 11/10/2021    Assessment & Plan   Jay Becker is a 41 year old male unvaccinated with with history of obesity, presented to the emergency room with ongoing cough fever shortness of breath with positive Covid test on 11/5/2021 was found to be hypoxic in the emergency room admitted for acute respiratory failure with hypoxia.   Currently requiring oxygen 8 lpm.      Acute respiratory failure with hypoxia secondary to COVID-19 pneumonia  -Unvaccinated patient  -Symptoms started 7 days prior to admission  -COVID-19 + 11/5/2021  -Presented with worsening dyspnea  -Requiring 8 L of supplemental O2 to keep oxygenation above 90%  -Will continue on Decadron and remdisivir  -Enoxaparin prophylaxis  -GI prophylaxis  -Supplemental O2 wean as able    Insurance/financial issues  -Patient stated that he is currently changing his work and has no insurance coverage. He wants to talk to . Consult placed for .     DVT Prophylaxis: Enoxaparin (Lovenox) SQ  Code Status: Full Code    Disposition: Expected discharge: anticipate at least 2-4 nights of hospital course until breathing improved    Torsten Grire MD    Interval History   Patient seen and examined. He stated that he is feeling better. No fever. No nausea or vomiting. Feels comfortable on 8 liters oxygen.     -Data reviewed today: I reviewed all new labs and imaging results over the last 24 hours. I personally reviewed    Physical Exam   Temp: 97.2  F (36.2  C) Temp src: Oral BP: (!) 127/90 Pulse: 84   Resp: 20 SpO2: 93 % O2 Device: Oxymask Oxygen Delivery: 8 LPM  Vitals:    11/08/21 1156 11/09/21 1826   Weight: 127 kg (280 lb) 99.7 kg (219 lb 14.4 oz)     Vital Signs with Ranges  Temp:  [97.2  F (36.2  C)-98.1  F (36.7  C)] 97.2  F (36.2  C)  Pulse:  [78-90] 84  Resp:  [20-24] 20  BP: (126-155)/()  127/90  FiO2 (%):  [91 %] 91 %  SpO2:  [90 %-94 %] 93 %  No intake/output data recorded.    GEN:  Alert, oriented x 3, appears comfortable, NAD.  HEENT:  Normocephalic/atraumatic, no scleral icterus, no nasal discharge, mouth moist.  CV:  Regular rate and rhythm, no murmur or JVD.  S1 + S2 noted, no S3 or S4.  LUNGS:  Clear to auscultation bilaterally without rales/rhonchi/wheezing/retractions.  Symmetric chest rise on inhalation noted.  ABD:  Active bowel sounds, soft, non-tender/non-distended.  No rebound/guarding/rigidity.  EXT:  No edema or cyanosis.  Hands/feet warm to touch with good signs of peripheral perfusion.  No joint synovitis noted.  SKIN:  Dry to touch, no exanthems noted in the visualized areas.  NEURO:  Symmetric muscle strength, sensation to touch grossly intact.  No new focal deficits appreciated.    Medications       remdesivir  100 mg Intravenous Q24H    And     sodium chloride 0.9%  50 mL Intravenous Q24H     acetaminophen  975 mg Oral Q8H     dexamethasone  6 mg Oral Daily     enoxaparin ANTICOAGULANT  40 mg Subcutaneous BID     famotidine  20 mg Oral BID     sodium chloride (PF)  3 mL Intracatheter Q8H       Data   Recent Labs   Lab 11/10/21  0617 11/09/21  0656 11/09/21  0532 11/08/21  1203   WBC 14.3* 10.9 7.1 8.7   HGB 14.2 14.6 9.3* 15.1   MCV 91 91 93 90    208 129* 199     --  139 132*   POTASSIUM 4.3  --  4.1 4.3   CHLORIDE 102  --  107 95   CO2 29  --  28 30   BUN 16  --  10 10   CR 0.78  --  0.70 1.04   ANIONGAP 7  --  4 7   ARNALDO 8.4*  --  7.3* 8.4*   *  --  164* 143*   ALBUMIN  --   --   --  3.4   PROTTOTAL  --   --   --  8.5   BILITOTAL  --   --   --  0.4   ALKPHOS  --   --   --  44   ALT  --   --   --  71*   AST  --   --   --  65*   TROPONIN  --   --   --  <0.015       No results found for this or any previous visit (from the past 24 hour(s)).

## 2021-11-10 NOTE — PROGRESS NOTES
[facility] Treatment Plan    Patient Score: 3    Patient Acuity: 5    Clinical Indication for Therapy: NONE     Therapy Ordered: Change scheduled to prn     Assessment Summary: Pt does not have pulmonary history . No indication for MDI for covid .         Isidra Liang, RT

## 2021-11-10 NOTE — PLAN OF CARE
Admitting Diagnosis: COVID+  Pertinent History: Obesity.  For vitals and assessment please see flow sheet.   Living Situation: Home   Pain plan: denies pain   Mobility: assistance, stand-by  Baseline activity: Independent.   Alarms/Safety: BA.   LDA's: PIV.  Pertinent test results: K+ 4.1, CR: 0.70.  Consults:None  Abnormals/Pending: None.   Other Cares/Comments: A & O x4, VSS, LS clear, O2 95% 8 L Oxy mask  Discharge Disposition: 2-4 days.  Discharge Time: TBD.

## 2021-11-11 LAB
ALBUMIN SERPL-MCNC: 2.8 G/DL (ref 3.4–5)
ALP SERPL-CCNC: 38 U/L (ref 40–150)
ALT SERPL W P-5'-P-CCNC: 45 U/L (ref 0–70)
ANION GAP SERPL CALCULATED.3IONS-SCNC: 5 MMOL/L (ref 3–14)
AST SERPL W P-5'-P-CCNC: 29 U/L (ref 0–45)
BILIRUB DIRECT SERPL-MCNC: 0.1 MG/DL (ref 0–0.2)
BILIRUB SERPL-MCNC: 0.6 MG/DL (ref 0.2–1.3)
BUN SERPL-MCNC: 18 MG/DL (ref 7–30)
CALCIUM SERPL-MCNC: 8.3 MG/DL (ref 8.5–10.1)
CHLORIDE BLD-SCNC: 100 MMOL/L (ref 94–109)
CO2 SERPL-SCNC: 30 MMOL/L (ref 20–32)
CREAT SERPL-MCNC: 0.82 MG/DL (ref 0.66–1.25)
CRP SERPL-MCNC: 26.5 MG/L (ref 0–8)
ERYTHROCYTE [DISTWIDTH] IN BLOOD BY AUTOMATED COUNT: 11.9 % (ref 10–15)
GFR SERPL CREATININE-BSD FRML MDRD: >90 ML/MIN/1.73M2
GLUCOSE BLD-MCNC: 152 MG/DL (ref 70–99)
HCT VFR BLD AUTO: 43.9 % (ref 40–53)
HGB BLD-MCNC: 14.3 G/DL (ref 13.3–17.7)
MCH RBC QN AUTO: 30.4 PG (ref 26.5–33)
MCHC RBC AUTO-ENTMCNC: 32.6 G/DL (ref 31.5–36.5)
MCV RBC AUTO: 93 FL (ref 78–100)
PLATELET # BLD AUTO: 353 10E3/UL (ref 150–450)
POTASSIUM BLD-SCNC: 4.2 MMOL/L (ref 3.4–5.3)
PROT SERPL-MCNC: 7.6 G/DL (ref 6.8–8.8)
RBC # BLD AUTO: 4.7 10E6/UL (ref 4.4–5.9)
SODIUM SERPL-SCNC: 135 MMOL/L (ref 133–144)
WBC # BLD AUTO: 10.1 10E3/UL (ref 4–11)

## 2021-11-11 PROCEDURE — 36415 COLL VENOUS BLD VENIPUNCTURE: CPT | Performed by: INTERNAL MEDICINE

## 2021-11-11 PROCEDURE — 82248 BILIRUBIN DIRECT: CPT | Performed by: INTERNAL MEDICINE

## 2021-11-11 PROCEDURE — 85027 COMPLETE CBC AUTOMATED: CPT | Performed by: INTERNAL MEDICINE

## 2021-11-11 PROCEDURE — 99232 SBSQ HOSP IP/OBS MODERATE 35: CPT | Performed by: INTERNAL MEDICINE

## 2021-11-11 PROCEDURE — 250N000009 HC RX 250: Performed by: INTERNAL MEDICINE

## 2021-11-11 PROCEDURE — 86140 C-REACTIVE PROTEIN: CPT | Performed by: INTERNAL MEDICINE

## 2021-11-11 PROCEDURE — 250N000012 HC RX MED GY IP 250 OP 636 PS 637: Performed by: INTERNAL MEDICINE

## 2021-11-11 PROCEDURE — 258N000003 HC RX IP 258 OP 636: Performed by: INTERNAL MEDICINE

## 2021-11-11 PROCEDURE — 250N000013 HC RX MED GY IP 250 OP 250 PS 637: Performed by: INTERNAL MEDICINE

## 2021-11-11 PROCEDURE — 250N000011 HC RX IP 250 OP 636: Performed by: INTERNAL MEDICINE

## 2021-11-11 PROCEDURE — 120N000001 HC R&B MED SURG/OB

## 2021-11-11 RX ADMIN — FAMOTIDINE 20 MG: 20 TABLET ORAL at 19:52

## 2021-11-11 RX ADMIN — ACETAMINOPHEN 975 MG: 325 TABLET, FILM COATED ORAL at 14:46

## 2021-11-11 RX ADMIN — ENOXAPARIN SODIUM 40 MG: 40 INJECTION SUBCUTANEOUS at 19:52

## 2021-11-11 RX ADMIN — ENOXAPARIN SODIUM 40 MG: 40 INJECTION SUBCUTANEOUS at 10:30

## 2021-11-11 RX ADMIN — DEXAMETHASONE 6 MG: 2 TABLET ORAL at 14:47

## 2021-11-11 RX ADMIN — FAMOTIDINE 20 MG: 20 TABLET ORAL at 10:30

## 2021-11-11 RX ADMIN — REMDESIVIR 100 MG: 100 INJECTION, POWDER, LYOPHILIZED, FOR SOLUTION INTRAVENOUS at 17:06

## 2021-11-11 RX ADMIN — ACETAMINOPHEN 975 MG: 325 TABLET, FILM COATED ORAL at 06:02

## 2021-11-11 RX ADMIN — SODIUM CHLORIDE 50 ML: 900 INJECTION INTRAVENOUS at 17:09

## 2021-11-11 ASSESSMENT — ACTIVITIES OF DAILY LIVING (ADL)
ADLS_ACUITY_SCORE: 9

## 2021-11-11 ASSESSMENT — MIFFLIN-ST. JEOR: SCORE: 2045.23

## 2021-11-11 NOTE — PLAN OF CARE
VSS. A&Ox4. Denies pain and CP, Int SOB and HOUSTON. Weaned O2 to 6L via oxymask. Infrequent coughing, declines need for medication. Uses urinal at bedside. Resting throughout shift. Ambulating SBA. Possible discharge 2-4 days.

## 2021-11-11 NOTE — PROGRESS NOTES
[facility]    Hospitalist Progress Note  Provider : Torsten Grier MD  Date of Service (when I saw the patient): 11/11/2021    Assessment & Plan   Jay Becker is a 41 year old male unvaccinated with with history of obesity, presented to the emergency room with ongoing cough fever shortness of breath with positive Covid test on 11/5/2021 was found to be hypoxic in the emergency room admitted for acute respiratory failure with hypoxia.   Currently requiring oxygen 8 lpm.      Acute respiratory failure with hypoxia secondary to COVID-19 pneumonia  -Unvaccinated patient  -Symptoms started 7 days prior to admission.   -COVID-19 + 11/5/2021  -Presented with worsening dyspnea  - Cough and shortness of breath improving. Requiring 8 L of supplemental O2 to keep oxygenation above 90%.  -Will continue on Decadron for total of 10 days and remdisivir for 5 days  -Enoxaparin prophylaxis  -GI prophylaxis  -Supplemental O2 wean as able    Insurance/financial issues  -Patient stated that he is currently changing his work and has no insurance coverage.  consulted    DVT Prophylaxis: Enoxaparin (Lovenox) SQ  Code Status: Full Code    Disposition: Expected discharge: anticipate at least 2-4 nights of hospital course until breathing improved    Torsten Grier MD    Interval History   Patient seen and examined. He stated that he is feeling better. Cough and shortness of breath improving.  No fever. No nausea or vomiting. Feels comfortable on 8 liters oxygen.     -Data reviewed today: I reviewed all new labs and imaging results over the last 24 hours. I personally reviewed    Physical Exam   Temp: 98  F (36.7  C) Temp src: Oral BP: (!) 133/96 Pulse: 65   Resp: 20 SpO2: 96 % O2 Device: Oxymask Oxygen Delivery: 8 LPM  Vitals:    11/08/21 1156 11/09/21 1826 11/11/21 0552   Weight: 127 kg (280 lb) 99.7 kg (219 lb 14.4 oz) 128.7 kg (283 lb 11.2 oz)     Vital Signs with Ranges  Temp:  [97.1  F  (36.2  C)-98.6  F (37  C)] 98  F (36.7  C)  Pulse:  [65-83] 65  Resp:  [18-24] 20  BP: (119-143)/(67-96) 133/96  SpO2:  [93 %-96 %] 96 %  I/O last 3 completed shifts:  In: 3 [I.V.:3]  Out: 950 [Urine:950]    GEN:  Alert, oriented x 3, appears comfortable, NAD.  HEENT:  Normocephalic/atraumatic, no scleral icterus, no nasal discharge, mouth moist.  CV:  Regular rate and rhythm, no murmur or JVD.  S1 + S2 noted, no S3 or S4.  LUNGS:  Clear to auscultation bilaterally without rales/rhonchi/wheezing/retractions.  Symmetric chest rise on inhalation noted.  ABD:  Active bowel sounds, soft, non-tender/non-distended.  No rebound/guarding/rigidity.  EXT:  No edema or cyanosis.  Hands/feet warm to touch with good signs of peripheral perfusion.  No joint synovitis noted.  SKIN:  Dry to touch, no exanthems noted in the visualized areas.  NEURO:  Symmetric muscle strength, sensation to touch grossly intact.  No new focal deficits appreciated.    Medications       remdesivir  100 mg Intravenous Q24H    And     sodium chloride 0.9%  50 mL Intravenous Q24H     acetaminophen  975 mg Oral Q8H     dexamethasone  6 mg Oral Daily     enoxaparin ANTICOAGULANT  40 mg Subcutaneous BID     famotidine  20 mg Oral BID     sodium chloride (PF)  3 mL Intracatheter Q8H       Data   Recent Labs   Lab 11/11/21  0637 11/10/21  0617 11/09/21  0656 11/09/21  0532 11/08/21  1203   WBC 10.1 14.3* 10.9 7.1 8.7   HGB 14.3 14.2 14.6 9.3* 15.1   MCV 93 91 91 93 90    285 208 129* 199    138  --  139 132*   POTASSIUM 4.2 4.3  --  4.1 4.3   CHLORIDE 100 102  --  107 95   CO2 30 29  --  28 30   BUN 18 16  --  10 10   CR 0.82 0.78  --  0.70 1.04   ANIONGAP 5 7  --  4 7   ARNALDO 8.3* 8.4*  --  7.3* 8.4*   * 154*  --  164* 143*   ALBUMIN 2.8*  --   --   --  3.4   PROTTOTAL 7.6  --   --   --  8.5   BILITOTAL 0.6  --   --   --  0.4   ALKPHOS 38*  --   --   --  44   ALT 45  --   --   --  71*   AST 29  --   --   --  65*   TROPONIN  --   --   --   --   <0.015       No results found for this or any previous visit (from the past 24 hour(s)).

## 2021-11-11 NOTE — PLAN OF CARE
"Alert and oriented x 4. BP slightly elevated. Up with SBA. On oxygen, 8 LPM via oxymask.Denies pain. On oxygen, 8 LPM via oxymask. Has generalized weakness. Poor appetite, frequent nonproductive cough. Using urinal at bedside. Possible discharge 2-3 days    BP (!) 143/94 (BP Location: Left arm)   Pulse 73   Temp 98.5  F (36.9  C) (Oral)   Resp 24   Ht 1.549 m (5' 1\")   Wt 99.7 kg (219 lb 14.4 oz)   SpO2 94%   BMI 41.55 kg/m        "

## 2021-11-12 LAB
ALBUMIN SERPL-MCNC: 2.9 G/DL (ref 3.4–5)
ALP SERPL-CCNC: 39 U/L (ref 40–150)
ALT SERPL W P-5'-P-CCNC: 52 U/L (ref 0–70)
ANION GAP SERPL CALCULATED.3IONS-SCNC: 6 MMOL/L (ref 3–14)
AST SERPL W P-5'-P-CCNC: 33 U/L (ref 0–45)
BILIRUB DIRECT SERPL-MCNC: 0.1 MG/DL (ref 0–0.2)
BILIRUB SERPL-MCNC: 0.4 MG/DL (ref 0.2–1.3)
BUN SERPL-MCNC: 18 MG/DL (ref 7–30)
CALCIUM SERPL-MCNC: 8.3 MG/DL (ref 8.5–10.1)
CHLORIDE BLD-SCNC: 101 MMOL/L (ref 94–109)
CO2 SERPL-SCNC: 27 MMOL/L (ref 20–32)
CREAT SERPL-MCNC: 0.8 MG/DL (ref 0.66–1.25)
CRP SERPL-MCNC: 12.1 MG/L (ref 0–8)
ERYTHROCYTE [DISTWIDTH] IN BLOOD BY AUTOMATED COUNT: 11.8 % (ref 10–15)
GFR SERPL CREATININE-BSD FRML MDRD: >90 ML/MIN/1.73M2
GLUCOSE BLD-MCNC: 172 MG/DL (ref 70–99)
HCT VFR BLD AUTO: 44.4 % (ref 40–53)
HGB BLD-MCNC: 14.7 G/DL (ref 13.3–17.7)
MCH RBC QN AUTO: 30.1 PG (ref 26.5–33)
MCHC RBC AUTO-ENTMCNC: 33.1 G/DL (ref 31.5–36.5)
MCV RBC AUTO: 91 FL (ref 78–100)
PLATELET # BLD AUTO: 419 10E3/UL (ref 150–450)
POTASSIUM BLD-SCNC: 4.1 MMOL/L (ref 3.4–5.3)
PROT SERPL-MCNC: 7.6 G/DL (ref 6.8–8.8)
RBC # BLD AUTO: 4.88 10E6/UL (ref 4.4–5.9)
SODIUM SERPL-SCNC: 134 MMOL/L (ref 133–144)
WBC # BLD AUTO: 9.5 10E3/UL (ref 4–11)

## 2021-11-12 PROCEDURE — 250N000009 HC RX 250: Performed by: INTERNAL MEDICINE

## 2021-11-12 PROCEDURE — 250N000013 HC RX MED GY IP 250 OP 250 PS 637: Performed by: INTERNAL MEDICINE

## 2021-11-12 PROCEDURE — 258N000003 HC RX IP 258 OP 636: Performed by: INTERNAL MEDICINE

## 2021-11-12 PROCEDURE — 85041 AUTOMATED RBC COUNT: CPT | Performed by: INTERNAL MEDICINE

## 2021-11-12 PROCEDURE — 36415 COLL VENOUS BLD VENIPUNCTURE: CPT | Performed by: INTERNAL MEDICINE

## 2021-11-12 PROCEDURE — 85027 COMPLETE CBC AUTOMATED: CPT | Performed by: INTERNAL MEDICINE

## 2021-11-12 PROCEDURE — 250N000012 HC RX MED GY IP 250 OP 636 PS 637: Performed by: INTERNAL MEDICINE

## 2021-11-12 PROCEDURE — 80053 COMPREHEN METABOLIC PANEL: CPT | Performed by: INTERNAL MEDICINE

## 2021-11-12 PROCEDURE — 120N000001 HC R&B MED SURG/OB

## 2021-11-12 PROCEDURE — 99232 SBSQ HOSP IP/OBS MODERATE 35: CPT | Performed by: INTERNAL MEDICINE

## 2021-11-12 PROCEDURE — 86140 C-REACTIVE PROTEIN: CPT | Performed by: INTERNAL MEDICINE

## 2021-11-12 PROCEDURE — 82248 BILIRUBIN DIRECT: CPT | Performed by: INTERNAL MEDICINE

## 2021-11-12 PROCEDURE — 250N000011 HC RX IP 250 OP 636: Performed by: INTERNAL MEDICINE

## 2021-11-12 RX ADMIN — SODIUM CHLORIDE 50 ML: 900 INJECTION INTRAVENOUS at 16:42

## 2021-11-12 RX ADMIN — ENOXAPARIN SODIUM 40 MG: 40 INJECTION SUBCUTANEOUS at 08:49

## 2021-11-12 RX ADMIN — ENOXAPARIN SODIUM 40 MG: 40 INJECTION SUBCUTANEOUS at 21:40

## 2021-11-12 RX ADMIN — REMDESIVIR 100 MG: 100 INJECTION, POWDER, LYOPHILIZED, FOR SOLUTION INTRAVENOUS at 16:41

## 2021-11-12 RX ADMIN — FAMOTIDINE 20 MG: 20 TABLET ORAL at 21:40

## 2021-11-12 RX ADMIN — ACETAMINOPHEN 975 MG: 325 TABLET, FILM COATED ORAL at 13:36

## 2021-11-12 RX ADMIN — ACETAMINOPHEN 975 MG: 325 TABLET, FILM COATED ORAL at 21:40

## 2021-11-12 RX ADMIN — SALINE NASAL SPRAY 1 SPRAY: 1.5 SOLUTION NASAL at 12:38

## 2021-11-12 RX ADMIN — FAMOTIDINE 20 MG: 20 TABLET ORAL at 08:49

## 2021-11-12 RX ADMIN — ACETAMINOPHEN 975 MG: 325 TABLET, FILM COATED ORAL at 05:31

## 2021-11-12 RX ADMIN — DEXAMETHASONE 6 MG: 2 TABLET ORAL at 12:38

## 2021-11-12 ASSESSMENT — ACTIVITIES OF DAILY LIVING (ADL)
ADLS_ACUITY_SCORE: 9

## 2021-11-12 NOTE — PLAN OF CARE
A/O. Denies pain. Weaning oxygen this shift. 4 LPM nasal cannula tolerating well. Up with SBA. Will continue with POC.    Weaned O2 to 3 LPM.

## 2021-11-12 NOTE — PLAN OF CARE
VSS. On 5L oxymizer. LS diminished with expiratory wheezes. On Remdesivir, Decadron and lovenox. A&O. SBA. CRP 26.5. Continue to monitor patient.

## 2021-11-12 NOTE — PROGRESS NOTES
[facility]    Hospitalist Progress Note  Provider : Torsten Grier MD  Date of Service (when I saw the patient): 11/12/2021    Assessment & Plan   Jay Becker is a 41 year old male unvaccinated with with history of obesity, presented to the emergency room with ongoing cough fever shortness of breath with positive Covid test on 11/5/2021 was found to be hypoxic in the emergency room admitted for acute respiratory failure with hypoxia.   Currently requiring oxygen 8 lpm.      Acute respiratory failure with hypoxia secondary to COVID-19 pneumonia  -Unvaccinated patient  -Symptoms started 7 days prior to admission.   -COVID-19 + 11/5/2021  -Presented with worsening dyspnea  -Cough and shortness of breath improving. Oxygen requirement down to 4 lpm. Will continue to wean off oxygen.  -Will continue on Decadron for total of 10 days and remdisivir for 5 days  -Enoxaparin prophylaxis  -GI prophylaxis  -Supplemental O2 wean as able    Insurance/financial issues  -Patient stated that he is currently changing his work and has no insurance coverage.  consulted    DVT Prophylaxis: Enoxaparin (Lovenox) SQ  Code Status: Full Code    Disposition: Expected discharge: anticipate anticipate discharge in 1 to 3 days if able to wean off oxygen supplement    Torsten Grier MD    Interval History   Patient seen and examined. He stated that he is feeling much better today. Cough and shortness of breath improving.  No fever. No nausea or vomiting. Feels comfortable on 4 liters oxygen.     -Data reviewed today: I reviewed all new labs and imaging results over the last 24 hours. I personally reviewed    Physical Exam   Temp: 98.1  F (36.7  C) Temp src: Oral BP: (!) 132/94 Pulse: 59   Resp: 20 SpO2: 95 % O2 Device: Nasal cannula Oxygen Delivery: 4 LPM  Vitals:    11/08/21 1156 11/09/21 1826 11/11/21 0552   Weight: 127 kg (280 lb) 99.7 kg (219 lb 14.4 oz) 128.7 kg (283 lb 11.2 oz)     Vital Signs  with Ranges  Temp:  [96.9  F (36.1  C)-98.2  F (36.8  C)] 98.1  F (36.7  C)  Pulse:  [52-69] 59  Resp:  [20] 20  BP: (125-145)/(74-94) 132/94  FiO2 (%):  [90 %] 90 %  SpO2:  [94 %-97 %] 95 %  I/O last 3 completed shifts:  In: -   Out: 1075 [Urine:1075]    GEN:  Alert, oriented x 3, appears comfortable, NAD.  HEENT:  Normocephalic/atraumatic, no scleral icterus, no nasal discharge, mouth moist.  CV:  Regular rate and rhythm, no murmur or JVD.  S1 + S2 noted, no S3 or S4.  LUNGS:  Clear to auscultation bilaterally without rales/rhonchi/wheezing/retractions.  Symmetric chest rise on inhalation noted.  ABD:  Active bowel sounds, soft, non-tender/non-distended.  No rebound/guarding/rigidity.  EXT:  No edema or cyanosis.  Hands/feet warm to touch with good signs of peripheral perfusion.  No joint synovitis noted.  SKIN:  Dry to touch, no exanthems noted in the visualized areas.  NEURO:  Symmetric muscle strength, sensation to touch grossly intact.  No new focal deficits appreciated.    Medications       remdesivir  100 mg Intravenous Q24H    And     sodium chloride 0.9%  50 mL Intravenous Q24H     acetaminophen  975 mg Oral Q8H     dexamethasone  6 mg Oral Daily     enoxaparin ANTICOAGULANT  40 mg Subcutaneous BID     famotidine  20 mg Oral BID     sodium chloride (PF)  3 mL Intracatheter Q8H       Data   Recent Labs   Lab 11/12/21  0719 11/11/21  0637 11/10/21  0617 11/09/21  0532 11/08/21  1203   WBC 9.5 10.1 14.3*   < > 8.7   HGB 14.7 14.3 14.2   < > 15.1   MCV 91 93 91   < > 90    353 285   < > 199    135 138   < > 132*   POTASSIUM 4.1 4.2 4.3   < > 4.3   CHLORIDE 101 100 102   < > 95   CO2 27 30 29   < > 30   BUN 18 18 16   < > 10   CR 0.80 0.82 0.78   < > 1.04   ANIONGAP 6 5 7   < > 7   ANRALDO 8.3* 8.3* 8.4*   < > 8.4*   * 152* 154*   < > 143*   ALBUMIN 2.9* 2.8*  --   --  3.4   PROTTOTAL 7.6 7.6  --   --  8.5   BILITOTAL 0.4 0.6  --   --  0.4   ALKPHOS 39* 38*  --   --  44   ALT 52 45  --   --  71*    AST 33 29  --   --  65*   TROPONIN  --   --   --   --  <0.015    < > = values in this interval not displayed.       No results found for this or any previous visit (from the past 24 hour(s)).

## 2021-11-12 NOTE — PLAN OF CARE
Resumed care from 0603-7027. A/O. Denies pain. 6 LPM nasal oxymizer. Up with SBA. Special precautions maintained for covid +.

## 2021-11-13 VITALS
HEIGHT: 61 IN | DIASTOLIC BLOOD PRESSURE: 97 MMHG | OXYGEN SATURATION: 93 % | HEART RATE: 61 BPM | RESPIRATION RATE: 22 BRPM | SYSTOLIC BLOOD PRESSURE: 131 MMHG | TEMPERATURE: 98.1 F | BODY MASS INDEX: 53.56 KG/M2 | WEIGHT: 283.7 LBS

## 2021-11-13 LAB
ERYTHROCYTE [DISTWIDTH] IN BLOOD BY AUTOMATED COUNT: 11.8 % (ref 10–15)
HCT VFR BLD AUTO: 44.2 % (ref 40–53)
HGB BLD-MCNC: 14.9 G/DL (ref 13.3–17.7)
MCH RBC QN AUTO: 30.2 PG (ref 26.5–33)
MCHC RBC AUTO-ENTMCNC: 33.7 G/DL (ref 31.5–36.5)
MCV RBC AUTO: 90 FL (ref 78–100)
PLATELET # BLD AUTO: 481 10E3/UL (ref 150–450)
RBC # BLD AUTO: 4.93 10E6/UL (ref 4.4–5.9)
WBC # BLD AUTO: 12.2 10E3/UL (ref 4–11)

## 2021-11-13 PROCEDURE — 36415 COLL VENOUS BLD VENIPUNCTURE: CPT | Performed by: INTERNAL MEDICINE

## 2021-11-13 PROCEDURE — 99238 HOSP IP/OBS DSCHRG MGMT 30/<: CPT | Performed by: INTERNAL MEDICINE

## 2021-11-13 PROCEDURE — 85027 COMPLETE CBC AUTOMATED: CPT | Performed by: INTERNAL MEDICINE

## 2021-11-13 PROCEDURE — 250N000012 HC RX MED GY IP 250 OP 636 PS 637: Performed by: INTERNAL MEDICINE

## 2021-11-13 PROCEDURE — 250N000013 HC RX MED GY IP 250 OP 250 PS 637: Performed by: INTERNAL MEDICINE

## 2021-11-13 PROCEDURE — 250N000011 HC RX IP 250 OP 636: Performed by: INTERNAL MEDICINE

## 2021-11-13 RX ORDER — DEXAMETHASONE 6 MG/1
6 TABLET ORAL DAILY
Qty: 3 TABLET | Refills: 0 | Status: SHIPPED | OUTPATIENT
Start: 2021-11-13

## 2021-11-13 RX ADMIN — ACETAMINOPHEN 975 MG: 325 TABLET, FILM COATED ORAL at 13:59

## 2021-11-13 RX ADMIN — DEXAMETHASONE 6 MG: 2 TABLET ORAL at 13:59

## 2021-11-13 RX ADMIN — ACETAMINOPHEN 975 MG: 325 TABLET, FILM COATED ORAL at 06:58

## 2021-11-13 RX ADMIN — FAMOTIDINE 20 MG: 20 TABLET ORAL at 09:28

## 2021-11-13 RX ADMIN — ENOXAPARIN SODIUM 40 MG: 40 INJECTION SUBCUTANEOUS at 09:28

## 2021-11-13 ASSESSMENT — ACTIVITIES OF DAILY LIVING (ADL)
ADLS_ACUITY_SCORE: 9

## 2021-11-13 NOTE — PLAN OF CARE
VSS. A/Ox4. On 3L NC, LS dim. PT denies pain, SOB, N/V. SBA. Calls appropriately. Plan to wean from O2 and discharge in 1-3 days.

## 2021-11-13 NOTE — PLAN OF CARE
A&Ox4. VSS on 3 L nasal cannula. Denies pain, SOB. Up w/ SBA. Alarms on for safety. IV remdesivir, PO decadron. L PIV SL. Discharge pending O2 needs.

## 2021-11-13 NOTE — PROGRESS NOTES
Patient hospitalized for COVID. Cleared for discharge to home today per MD. Discharge instructions, medications, and follow-ups reviewed with patient in detail. Discharge medications, including decadron and eliquis were filled here. Patient verbalized understanding of discharge instructions. Belongings were returned to patient at time of discharge. Mother providing transport home.

## 2021-11-13 NOTE — DISCHARGE SUMMARY
[facility]    Discharge Summary  Hospitalist    Date of Admission:  11/8/2021  Date of Discharge:  11/13/2021  Discharging Provider: Torsten Grier MD  Date of Service (when I saw the patient): 11/13/21    Discharge Diagnoses      Acute respiratory failure with hypoxia secondary to COVID-19 pneumonia     Insurance/financial issues  -Patient stated that he is currently changing his work and has no insurance coverage.  consulted     History of Present Illness   Jay Becker is an 41 year old male who presented with Covid pneumonia.     Hospital Course   Jay Becker is a 41 year old male unvaccinated with with history of obesity, presented to the emergency room with ongoing cough fever shortness of breath with positive Covid test on 11/5/2021 was found to be hypoxic in the emergency room admitted for acute respiratory failure with hypoxia.   Currently requiring oxygen 8 lpm.      Acute respiratory failure with hypoxia secondary to COVID-19 pneumonia  -Unvaccinated patient  -Symptoms started 7 days prior to admission.   -COVID-19 + 11/5/2021  -Presented with worsening dyspnea  -Initially required oxygen 8 lpm and successfully weaned off oxygen.   -Will continue Decadron for total of 10 days. Completed remdisivir for 5 days  -Ordered Eliquis for 1 month for DVT prophylaxis    Insurance/financial issues  -Patient stated that he is currently changing his work and has no insurance coverage.  consulted     Significant Results and Procedures    Results for orders placed or performed during the hospital encounter of 11/08/21   CT Chest Pulmonary Embolism w Contrast    Narrative    CT CHEST PULMONARY EMBOLISM WITH CONTRAST 11/8/2021 1:39 PM    CLINICAL HISTORY: PE suspected, low/intermediate probability, positive  D-dimer. Shortness of breath. Fever.    TECHNIQUE: CT angiogram chest during arterial phase injection IV  contrast. 2D and 3D MIP reconstructions were  performed by the CT  technologist. Dose reduction techniques were used.     CONTRAST: 77mL Isovue-370    COMPARISON: None.    FINDINGS:  ANGIOGRAM CHEST: Pulmonary arteries are normal caliber and negative  for pulmonary emboli. Thoracic aorta is negative for dissection.    LUNGS AND PLEURA: Multifocal bilateral irregular patchy consolidation  involving all lobes of both lungs. This has a bibasilar predominance.  No effusion.    MEDIASTINUM/AXILLAE: Normal.    CORONARY ARTERY CALCIFICATION: None.    UPPER ABDOMEN: Hepatic steatosis.    MUSCULOSKELETAL: Normal.      Impression    IMPRESSION:  1.  Bilateral irregular pulmonary consolidation consistent with  atypical pneumonia such as COVID 19 pneumonia.  2.  No evidence for pulmonary embolism.  3.  Fatty liver.    CHRISTOPH ESTRADA MD         SYSTEM ID:  [id number]         Pending Results   None  Code Status   Full Code       Primary Care Physician   Physician No Ref-Primary        Discharge Disposition   Discharged to home  Condition at discharge: Stable    Consultations This Hospital Stay   SOCIAL WORK IP CONSULT  CARE MANAGEMENT / SOCIAL WORK IP CONSULT  PHARMACY DISCHARGE EDUCATION BY PHARMACIST    Time Spent on this Encounter   Torsten DELEON MD, MD, personally saw the patient today and spent less than or equal to 30 minutes discharging this patient.    Discharge Orders      COVID-19 GetWell Loop Referral      Reason for your hospital stay    Covid pneumonia     Contact provider    Contact your primary care provider if If increased trouble breathing, new arm/leg swelling, dizziness/passing out, falls, bleeding that doesn't stop, or uncontrolled pain.     Follow-up and recommended labs and tests     Follow up with primary care provider, Physician No Ref-Primary, within 7 days     Discharge - Quarantine/Isolation Instruction    Date of symptom onset:     Date of first positive test:    If you tested positive COVID-19 and show symptoms (fever, cough, body aches  or trouble breathing):        Stay home and away from others (self-isolate) until:        At least 10 days have passed since your symptoms started. AND...        You've had no fever-and no medicine that reduces fever for 1 full day (24 hours). AND...         Your other symptoms have resolved (gotten better).  If you tested positive for COVID-19 but don't show symptoms:       Stay home and away from others (self-isolate) until at least 10 days have passed since the date of your first positive COVID-19 test.     Activity    Your activity upon discharge: activity as tolerated     Diet    Follow this diet upon discharge: Orders Placed This Encounter      Combination Diet Regular Diet Adult     Discharge Medications   Current Discharge Medication List      START taking these medications    Details   apixaban ANTICOAGULANT (ELIQUIS) 2.5 MG tablet Take 1 tablet (2.5 mg) by mouth 2 times daily  Qty: 60 tablet, Refills: 0    Associated Diagnoses: Infection due to 2019 novel coronavirus      dexamethasone (DECADRON) 6 MG tablet Take 1 tablet (6 mg) by mouth daily  Qty: 3 tablet, Refills: 0    Associated Diagnoses: Infection due to 2019 novel coronavirus         CONTINUE these medications which have NOT CHANGED    Details   benzonatate (TESSALON) 100 MG capsule Take 100-200 mg by mouth 3 times daily as needed for cough       guaiFENesin-codeine (ROBITUSSIN AC) 100-10 MG/5ML solution Take 5-10 mLs by mouth every 4 hours as needed for cough             Allergies   No Known Allergies  Data   Most Recent 3 CBC's:Recent Labs   Lab Test 11/13/21  0951 11/12/21  0719 11/11/21  0637   WBC 12.2* 9.5 10.1   HGB 14.9 14.7 14.3   MCV 90 91 93   * 419 353      Most Recent 3 BMP's:  Recent Labs   Lab Test 11/12/21  0719 11/11/21  0637 11/10/21  0617    135 138   POTASSIUM 4.1 4.2 4.3   CHLORIDE 101 100 102   CO2 27 30 29   BUN 18 18 16   CR 0.80 0.82 0.78   ANIONGAP 6 5 7   ARNALDO 8.3* 8.3* 8.4*   * 152* 154*     Most  Recent 2 LFT's:  Recent Labs   Lab Test 11/12/21  0719 11/11/21  0637   AST 33 29   ALT 52 45   ALKPHOS 39* 38*   BILITOTAL 0.4 0.6     Most Recent INR's and Anticoagulation Dosing History:  Anticoagulation Dose History    There is no flowsheet data to display.       Most Recent 3 Troponin's:  Recent Labs   Lab Test 11/08/21  1203   TROPONIN <0.015     Most Recent Cholesterol Panel:No lab results found.  Most Recent 6 Bacteria Isolates From Any Culture (See EPIC Reports for Culture Details):No lab results found.  Most Recent TSH, T4 and A1c Labs:No lab results found.
